# Patient Record
Sex: MALE | Race: WHITE | URBAN - METROPOLITAN AREA
[De-identification: names, ages, dates, MRNs, and addresses within clinical notes are randomized per-mention and may not be internally consistent; named-entity substitution may affect disease eponyms.]

---

## 2018-01-03 ENCOUNTER — HOSPITAL ENCOUNTER (EMERGENCY)
Facility: CLINIC | Age: 33
Discharge: HOME | End: 2018-01-03
Attending: EMERGENCY MEDICINE | Admitting: EMERGENCY MEDICINE

## 2018-01-03 VITALS
WEIGHT: 196.2 LBS | DIASTOLIC BLOOD PRESSURE: 93 MMHG | HEIGHT: 72 IN | SYSTOLIC BLOOD PRESSURE: 142 MMHG | BODY MASS INDEX: 26.57 KG/M2 | OXYGEN SATURATION: 96 % | TEMPERATURE: 98.3 F | RESPIRATION RATE: 20 BRPM

## 2018-01-03 DIAGNOSIS — G89.29 CHRONIC LEFT SHOULDER PAIN: ICD-10-CM

## 2018-01-03 DIAGNOSIS — M25.512 CHRONIC LEFT SHOULDER PAIN: ICD-10-CM

## 2018-01-03 PROCEDURE — 99282 EMERGENCY DEPT VISIT SF MDM: CPT

## 2018-01-03 ASSESSMENT — ENCOUNTER SYMPTOMS
NUMBNESS: 1
FEVER: 0

## 2018-01-03 NOTE — ED PROVIDER NOTES
History     Chief Complaint:  Shoulder Pain    HPI   Tian Chavez is a 32 year old male who presents to the emergency department today for evaluation of shoulder pain. The night of 12/23/17, the patient slept in bed with his sick daughter, curling his body up to fit in the small bed. The morning of 12/24/17 the patient was reportedly experiencing shoulder aches in his left shoulder after doing nothing in particular. The patient reports that the next morning, 12/25/17, he was experiencing extreme pain. The patient presents to the emergency department today after the pain has not subsided, and has reportedly also moved to his left hand, and reportedly cannot feel his hand. He reports that the pain worsens with pressure, and that the lidocaine patches he has been using for pain have not been helping much. The patient reports that he has not had pain similar to this before the onset of the current symptoms, and has no fever. Of note, the patient's wife reports that on 12/25/17, the patient was lifting heavy boxes and bags when loading their car to move from [place] to [place]. Of note, the patient's primary care physician, is based in [place] and the patient has not gotten a primary in [place] yet.     Allergies:  No Known Drug Allergies    Medications:    Medications reviewed. No pertinent medications.    Past Medical History:    Celiac disease    Past Surgical History:    Cholecystectomy  ENT surgery  Orthopedic surgery    Family History:    History reviewed. No pertinent family history.    Social History:  The patient was accompanied to the ED by his wife.  Smoking Status: Current Everyday Smoker  Smokeless Tobacco: Never Used  Alcohol Use: Positive  Marital Status: Unknown     Review of Systems   Constitutional: Negative for fever.   Musculoskeletal:        Shoulder pain (L)   Neurological: Positive for numbness (left hand).   10 point review of systems performed and is negative except as above and  in HPI.    Physical Exam     Patient Vitals for the past 24 hrs:   BP Temp Temp src Heart Rate Resp SpO2 Height Weight   01/03/18 0647 (!) 142/93 98.3  F (36.8  C) Oral 110 20 96 % 1.829 m (6') 89 kg (196 lb 3.2 oz)     Physical Exam  General: Resting on the gurney, appears somewhat uncomfortable  Head:  The scalp, face, and head appear normal  Mouth/Throat: Mucus membranes are moist  CV:  Regular rate    Normal S1 and S2  No pathological murmur   Resp:  Breath sounds clear and equal bilaterally    Non-labored, no retractions or accessory muscle use    No coarseness    No wheezing   GI:  Abdomen is soft, no rigidity    No tenderness to palpation  MS:  Left shoulder without deformity, no point tenderness to palpation. Difficulty with abduction. Tenderness to palpation of the shoulder.  Skin:  No rash or lesions noted.  Neuro: Speech is normal and fluent. No apparent deficit. Sensation intact in the hand and fingers. The patient is able to give a thumb's up and ok sign and touch his thumb to pinky. He is able to flex and extend fingers and abduct and adduct fingers.  Brisk capillary refill in the hand.   Psych:  Awake. Alert.  Normal affect.      Appropriate interactions.    Emergency Department Course     Emergency Department Course:    0647 Nursing notes and vitals reviewed.    0700 I performed an exam of the patient as documented above.     0758 I personally reviewed the results with the patient and answered all related questions prior to discharge.    Impression & Plan      Medical Decision Making:  Tian Chavez is a 32 year old male who presents for evaluation of shoulder pain. Differential diagnosis includes clavicular or scapular fracture, AC joint or sternoclavicular joint pathology, rotator cuff injury, shoulder dislocation, humoral fracture, rheumatological disorder, metastasis, or referred pain from the gallbladder, heart, or diaphragm.    There is no sign of vascular or neurologic compromise and  full range of motion is intact. There is no history of recent trauma and no point tenderness over bony prominences or over the joints. There are no other findings concerning for the above listed differential, including no chest pain, shortness of breath, nausea, vomiting, diaphoresis or other concerning cardiac symptoms.   Given the negative workup, no imaging was indicated at this time and the pain is most likely muscular or soft tissue in origin. Follow up with primary care doctor as indicated and the patient was referred to a chiropractor as well.     Diagnosis:    ICD-10-CM    1. Chronic left shoulder pain M25.512 [facility], HAND, AND CHIR[facility]RACTIC REFERRAL    G89.29      Disposition:   Discharge    Scribe Disclosure:  Moshe HOLGUIN, am serving as a scribe at 6:55 AM on 1/3/2018 to document services personally performed by Chloe Ackerman MD based on my observations and the provider's statements to me.     EMERGENCY DEPARTMENT       Chloe Ackerman MD  01/07/18 7734

## 2018-01-03 NOTE — ED NOTES
Pt c/o left shoulder pain that started 1 week ago. Denies injury. C/o numbness and tingling in his fingers

## 2018-01-03 NOTE — ED AVS SNAPSHOT
Emergency Department    [address] [place] [address]    Phone:  [phone]    Fax:  [phone]                                       Tian Chavez   MRN: [id number]    Department:   Emergency Department   Date of Visit:  1/3/2018           After Visit Summary Signature Page     I have received my discharge instructions, and my questions have been answered. I have discussed any challenges I see with this plan with the nurse or doctor.    ..........................................................................................................................................  Patient/Patient Representative Signature      ..........................................................................................................................................  Patient Representative Print Name and Relationship to Patient    ..................................................               ................................................  Date                                            Time    ..........................................................................................................................................  Reviewed by Signature/Title    ...................................................              ..............................................  Date                                                            Time

## 2018-01-03 NOTE — ED NOTES
Pt states they just moved to [place] from [place] and don't have insurance and probably won't be able to follow up with anyone. Pt given appropriate insurance resources by registration.

## 2018-01-03 NOTE — ED AVS SNAPSHOT
Emergency Department    [address] [facility] [address]    Phone:  [phone]    Fax:  [phone]                                       Tina Chavez   MRN: [id number]    Department:   Emergency Department   Date of Visit:  1/3/2018           Patient Information     Date Of Birth          1985        Your diagnoses for this visit were:     Chronic left shoulder pain        You were seen by Chloe Ackerman MD.      Follow-up Information     Schedule an appointment as soon as possible for a visit with Orthopaedics, [facility].    Why:  As needed    Contact information:    [address] [address] [phone]          Follow up with David Stephens MD. Schedule an appointment as soon as possible for a visit in 1 week.    Specialty:  Family Practice    Why:  As needed    Contact information:    [employer]  [address]  [facility] [address] [phone]          Follow up with  Emergency Department.    Specialty:  EMERGENCY MEDICINE    Why:  As needed, If symptoms worsen    Contact information:    [address] [facility] [address] [phone]      Discharge References/Attachments     SHOULDER IMPINGEMENT SYNDROME (ENGLISH)      24 Hour Appointment Hotline       To make an appointment at any [facility] clinic, call [phone] ([phone]). If you don't have a family doctor or clinic, we will help you find one. [facility] clinics are conveniently located to serve the needs of you and your family.          ED Discharge Orders     [facility], HAND, AND CH[facility]RACTIC REFERRAL       **This order will print in the [facility] Scheduling Office**    Physical Therapy, Hand Therapy and Chiropractic Care are available through:    *[facility]  *[facility]  *[facility]    Call one number to schedule at any of the above locations: [phone].    Your provider has referred you to: [facility] - PT and/or Chiropractic Care determined by clinical presentation at [facility] or [facility] Initial Visit    Indication/Reason for Referral: Shoulder Pain  Onset of Illness: 10 days  Therapy Orders: Evaluate and Treat  Special Programs: None  Special Request: None    Alo Prajapati      Additional Comments for the Therapist or Chiropractor:       Please be aware that coverage of these services is subject to the terms and limitations of your health insurance plan.  Call member services at your health plan with any benefit or coverage questions.      Please bring the following to your appointment:    *Your personal calendar for scheduling future appointments  *Comfortable clothing                     Review of your medicines      Notice     You have not been prescribed any medications.            Orders Needing Specimen Collection     None      Pending Results     No orders found from 1/1/2018 to 1/4/2018.            Pending Culture Results     No orders found from 1/1/2018 to 1/4/2018.            Pending Results Instructions     If you had any lab results that were not finalized at the time of your Discharge, you can call the ED Lab Result RN at [phone]. You will be contacted by this team for any positive Lab results or changes in treatment. The nurses are available 7 days a week from 10A to 6:30P.  You can leave a message 24 hours per day and they will return your call.        Test Results From Your Hospital Stay               Clinical Quality Measure: Blood Pressure Screening     Your blood pressure was checked while you were in the emergency department today. The last reading we obtained was  BP: (!) 142/93 . Please read the guidelines below about what these numbers mean and what you should do about them.  If your systolic blood pressure (the top number) is less than 120 and your diastolic blood pressure (the bottom number) is less than 80, then your blood pressure is normal. There is nothing more that you  "need to do about it.  If your systolic blood pressure (the top number) is 120-139 or your diastolic blood pressure (the bottom number) is 80-89, your blood pressure may be higher than it should be. You should have your blood pressure rechecked within a year by a primary care provider.  If your systolic blood pressure (the top number) is 140 or greater or your diastolic blood pressure (the bottom number) is 90 or greater, you may have high blood pressure. High blood pressure is treatable, but if left untreated over time it can put you at risk for heart attack, stroke, or kidney failure. You should have your blood pressure rechecked by a primary care provider within the next 4 weeks.  If your provider in the emergency department today gave you specific instructions to follow-up with your doctor or provider even sooner than that, you should follow that instruction and not wait for up to 4 weeks for your follow-up visit.        Thank you for choosing [facility]       Thank you for choosing [facility] for your care. Our goal is always to provide you with excellent care. Hearing back from our patients is one way we can continue to improve our services. Please take a few minutes to complete the written survey that you may receive in the mail after you visit with us. Thank you!        [employer] Information     [employer] lets you send messages to your doctor, view your test results, renew your prescriptions, schedule appointments and more. To sign up, go to [email or web address] . Click on \"Log in\" on the left side of the screen, which will take you to the Welcome page. Then click on \"Sign up Now\" on the right side of the page.     You will be asked to enter the access code listed below, as well as some personal information. Please follow the directions to create your username and password.     Your access code is: [email or web address]  Expires: 4/3/2018  7:46 AM     Your access code will  in 90 days. If you need help or a new code, " please call your [facility] clinic or [phone].        Care EveryWhere ID     This is your Care EveryWhere ID. This could be used by other organizations to access your [facility] medical records  [id number]        Equal Access to Services     [place] : Ginny Pickett, waaxda luqadaha, qaybta kaalmada laurierenateshanna, jennyfer dutta. So [facility] [phone].    ATENCIÓN: Si habla español, tiene a lira disposición servicios gratuitos de asistencia lingüística. Llame al [phone].    We comply with applicable federal civil rights laws and [place] laws. We do not discriminate on the basis of race, color, national origin, age, disability, sex, sexual orientation, or gender identity.            After Visit Summary       This is your record. Keep this with you and show to your community pharmacist(s) and doctor(s) at your next visit.